# Patient Record
Sex: MALE | Race: BLACK OR AFRICAN AMERICAN | URBAN - METROPOLITAN AREA
[De-identification: names, ages, dates, MRNs, and addresses within clinical notes are randomized per-mention and may not be internally consistent; named-entity substitution may affect disease eponyms.]

---

## 2021-05-05 ENCOUNTER — TELEPHONE (OUTPATIENT)
Dept: FAMILY MEDICINE | Facility: CLINIC | Age: 31
End: 2021-05-05

## 2021-05-05 DIAGNOSIS — Z20.822 COVID-19 RULED OUT: Primary | ICD-10-CM

## 2021-05-10 DIAGNOSIS — Z20.822 COVID-19 RULED OUT: ICD-10-CM

## 2021-05-10 LAB
SARS-COV-2 RNA RESP QL NAA+PROBE: NORMAL
SPECIMEN SOURCE: NORMAL

## 2021-05-10 PROCEDURE — 99207 PR NO CHARGE NURSE ONLY: CPT

## 2021-05-11 ENCOUNTER — TELEPHONE (OUTPATIENT)
Dept: FAMILY MEDICINE | Facility: CLINIC | Age: 31
End: 2021-05-11

## 2021-05-11 LAB
LABORATORY COMMENT REPORT: NORMAL
SARS-COV-2 RNA RESP QL NAA+PROBE: NEGATIVE
SPECIMEN SOURCE: NORMAL

## 2021-05-11 NOTE — TELEPHONE ENCOUNTER
Informed patient of negative COVID-19 results.    If patient had close exposure to someone with known COVID-19 (within 6 ft >15 min), provide follow isolation instructions based on patient's exposure, test, and work. Day of test is considered day 0.      You should stay away from others for 14 days if:  Someone in your home has COVID-19.  You live in a building with other people, where it s hard to stay away from others and easy to spread the virus to multiple people, like a long-term care facility.          You may consider being around others after 10 days if:  You do not have any symptoms.  You have not had a positive test for COVID-19.  No one in your home has COVID-19, and you do not live in a building with other people, where it s hard to stay away from others and easy to spread the virus to multiple people, like a long-term care facility.    Even after 10 days you must still:  Watch for symptoms through day 14. If you have any symptoms, stay home, separate yourself from others, and get tested right away.  Continue to wear a mask and stay at least 6 feet away from other people.          You may consider being around others after seven days only if:  You get tested for COVID-19 at least five full days after you had close contact with someone with COVID-19, and the test is negative.  You do not have any symptoms.  You have not had a positive test for COVID-19.  No one in your home has COVID-19, and you do not live in a building with other people, where it s hard to stay away from others and easy to spread the virus to multiple people, like a long-term care facility.    Even after seven days you must still:  Watch for symptoms through day 14. If you have any symptoms, stay home, separate yourself from others, and get tested right away.  Continue to wear a mask and stay at least 6 feet away from other people.        If patient continues to have ongoing exposure to someone with known COVID-19 (e.g. parent,  caretaker), patient needs to quarantine for 14 days after the last exposure to this person during their (the exposure's) 10 days in quarantine. As long as the exposure has no prolonged fever or symptoms (which can extend quarantine time), patient will have to isolate for:    10 days + 14 days after date of exposure's initial symptoms for those exposed to symptomatic patients  10 days + 14 days after date of exposure's positive test for those exposed to asymptomatic patients      If patient was symptomatic at time of testing and remains symptomatic for >72 hours after time of test, can repeat COVID-19 testing.    ./LR

## 2021-05-23 ENCOUNTER — HEALTH MAINTENANCE LETTER (OUTPATIENT)
Age: 31
End: 2021-05-23

## 2021-05-26 ENCOUNTER — RECORDS - HEALTHEAST (OUTPATIENT)
Dept: ADMINISTRATIVE | Facility: CLINIC | Age: 31
End: 2021-05-26

## 2021-05-27 ENCOUNTER — RECORDS - HEALTHEAST (OUTPATIENT)
Dept: ADMINISTRATIVE | Facility: CLINIC | Age: 31
End: 2021-05-27

## 2021-05-28 ENCOUNTER — RECORDS - HEALTHEAST (OUTPATIENT)
Dept: ADMINISTRATIVE | Facility: CLINIC | Age: 31
End: 2021-05-28

## 2021-09-12 ENCOUNTER — HEALTH MAINTENANCE LETTER (OUTPATIENT)
Age: 31
End: 2021-09-12

## 2022-06-19 ENCOUNTER — HEALTH MAINTENANCE LETTER (OUTPATIENT)
Age: 32
End: 2022-06-19

## 2022-11-19 ENCOUNTER — HEALTH MAINTENANCE LETTER (OUTPATIENT)
Age: 32
End: 2022-11-19

## 2023-07-01 ENCOUNTER — HEALTH MAINTENANCE LETTER (OUTPATIENT)
Age: 33
End: 2023-07-01